# Patient Record
Sex: FEMALE | Race: WHITE | Employment: FULL TIME | URBAN - METROPOLITAN AREA
[De-identification: names, ages, dates, MRNs, and addresses within clinical notes are randomized per-mention and may not be internally consistent; named-entity substitution may affect disease eponyms.]

---

## 2017-01-11 ENCOUNTER — HOSPITAL ENCOUNTER (OUTPATIENT)
Dept: GENERAL RADIOLOGY | Age: 37
Discharge: HOME | End: 2017-01-11
Attending: ALLERGY & IMMUNOLOGY
Payer: COMMERCIAL

## 2017-01-11 DIAGNOSIS — R06.83 SNORING: ICD-10-CM

## 2017-01-11 DIAGNOSIS — J30.9 ALLERGIC RHINITIS, CAUSE UNSPECIFIED: ICD-10-CM

## 2017-01-11 DIAGNOSIS — K21.9 GERD (GASTROESOPHAGEAL REFLUX DISEASE): ICD-10-CM

## 2017-01-11 PROCEDURE — 74241 XR UPPER GI W KUB/ BA SWALLOW: CPT

## 2024-03-12 ENCOUNTER — OFFICE VISIT (OUTPATIENT)
Age: 44
End: 2024-03-12

## 2024-03-12 VITALS
HEART RATE: 66 BPM | OXYGEN SATURATION: 100 % | WEIGHT: 145 LBS | HEIGHT: 65 IN | RESPIRATION RATE: 18 BRPM | DIASTOLIC BLOOD PRESSURE: 83 MMHG | TEMPERATURE: 98.4 F | SYSTOLIC BLOOD PRESSURE: 132 MMHG | BODY MASS INDEX: 24.16 KG/M2

## 2024-03-12 DIAGNOSIS — M54.50 LEFT-SIDED LOW BACK PAIN WITHOUT SCIATICA, UNSPECIFIED CHRONICITY: ICD-10-CM

## 2024-03-12 DIAGNOSIS — J30.89 ENVIRONMENTAL AND SEASONAL ALLERGIES: Primary | ICD-10-CM

## 2024-03-12 PROBLEM — I10 PRIMARY HYPERTENSION: Status: ACTIVE | Noted: 2024-03-12

## 2024-03-12 RX ORDER — LOSARTAN POTASSIUM 50 MG/1
50 TABLET ORAL DAILY
COMMUNITY

## 2024-03-12 RX ORDER — FLUTICASONE PROPIONATE 50 MCG
1 SPRAY, SUSPENSION (ML) NASAL DAILY
COMMUNITY

## 2024-03-12 ASSESSMENT — ENCOUNTER SYMPTOMS
GASTROINTESTINAL NEGATIVE: 1
RESPIRATORY NEGATIVE: 1
ALLERGIC/IMMUNOLOGIC NEGATIVE: 1
SORE THROAT: 0
EYES NEGATIVE: 1
BACK PAIN: 1
COUGH: 0

## 2024-03-12 NOTE — PROGRESS NOTES
3/12/2024   Prisca Marie (: 1980) is a 44 y.o. female, New patient, here for evaluation of the following chief complaint(s):  Back Pain (Pain and lump (poss swollen lymph) on lower left side of back (upper buttock) muscle pain - feels pain at night time and when waking up ++Liver enzymes up (ALT) in October after yearly PCP visit, got it rechecked 1 month after still slightly elevated - concerned it may be related) and Fever (Low grade fever on and off - usually at end of the day - started around 3 weeks ago.  Had symptoms of sore throat, nasal congestion, headache 3 weeks ago as well but all went away within 1 week. )     ASSESSMENT/PLAN:  Below is the assessment and plan developed based on review of pertinent history, physical exam, labs, studies, and medications.  1. Environmental and seasonal allergies  2. Left-sided low back pain without sciatica, unspecified chronicity  -     XR LUMBAR SPINE (2-3 VIEWS); Future         Handout given with care instructions  2. OTC for symptom management. Increase fluid intake, ensure adequate nutritional intake.  3. Follow up with PCP as needed.  4. Go to ED with development of any acute symptoms.     Follow up:  Return if symptoms worsen or fail to improve.  Follow up immediately for any new, worsening or changes or if symptoms are not improving over the next 5-7 days.     SUBJECTIVE/OBJECTIVE:    Back Pain  Associated symptoms include a fever.   Fever   Pertinent negatives include no congestion, coughing or sore throat.        Diagnoses and all orders for this visit:  Environmental and seasonal allergies  Left-sided low back pain without sciatica, unspecified chronicity  Back Pain (Pain and lump (poss swollen lymph) on lower left side of back (upper buttock) muscle pain - feels pain at night time and when waking up ++Liver enzymes up (ALT) in October after yearly PCP visit, got it rechecked 1 month after still slightly elevated - concerned it may be related) and Fever

## 2024-03-13 ENCOUNTER — TELEPHONE (OUTPATIENT)
Age: 44
End: 2024-03-13

## 2024-04-04 NOTE — TELEPHONE ENCOUNTER
Call pt regarding Xray results and left VM - no identifiers used - informed to call back \"regarding results\".  Clinic # left on VM - [id number]